# Patient Record
(demographics unavailable — no encounter records)

---

## 2024-11-15 NOTE — CARDIOLOGY SUMMARY
[de-identified] : 1/28/2022: IMPRESSIONS:Abnormal Study\par  * Myocardial Perfusion SPECT results are abnormal.\par  * There is a small, mild defect in proximal to mid\par  inferolateral wall that is fixed, suggestive of infarct.\par  * Post-stress gated wall motion analysis was performed\par  (LVEF = 51 %;LVEDV = 116 ml.), revealing normal LV\par  function with no segmental wall motion abnormalities. The\par  RV appears normal. [de-identified] : 2/4/2022: CONCLUSIONS:\par  1. Mitral annular calcification, otherwise normal mitral\par  valve. Minimal mitral regurgitation.\par  2. Severely dilated left atrium.  LA volume index = 64\par  cc/m2.\par  3. Normal left ventricular internal dimensions and wall\par  thicknesses.\par  4. Normal left ventricular systolic function. No segmental\par  wall motion abnormalities.\par  5. Mild diastolic dysfunction (Stage I).\par  6. Normal right ventricular size and function.  A device\par  wire is noted in the right heart.\par  \par  9/23/2018, 1. Normal mitral valve. Minimal mitral regurgitation.2. Mildly dilated left atrium. LA volume index = 40 cc/m2.3. Normal left ventricular internal dimensions and wallthicknesses.4. Normal left ventricular systolic function. No segmentalwall motion abnormalities.5. Mild diastolic dysfunction (Stage I).6. Normal right ventricular size and function.7. Normal tricuspid valve. Minimal tricuspid regurgitation.8. Estimated pulmonary artery systolic pressure equals 29mm Hg, assuming right atrial pressure equals 10 mm Hg,consistent with normal pulmonary pressures. LVEF 63%.

## 2024-11-15 NOTE — REASON FOR VISIT
[Arrhythmia/ECG Abnorrmalities] : arrhythmia/ECG abnormalities [Other: ____] : [unfilled] [FreeTextEntry3] : Dr Albert Palacios PCP; Phil Cedeno MD

## 2024-11-15 NOTE — DISCUSSION/SUMMARY
[FreeTextEntry1] : Impression:  1. Paroxysmal atrial fibrillation: s/p most recent PVI ablation on 7/27/2023. EKG performed today to assess for presence of recurrent afib and reveals NSR. Resume Eliquis for thromboembolic prophylaxis. Patient's AF burden is 0.5%.   2. Sinus node dysfunction: s/p dual chamber PPM. Recent remote check reveals normal PPM functioning with adequate pacing/sensing thresholds. Resume routine device checks as scheduled.  3. HTN: Resume oral antihypertensives as prescribed. Encouraged heart healthy diet, sodium restriction, and weight loss. Continue regular f/u with Cardiologist for further HTN management.  4. HLD: Resume statin therapy as prescribed and regular f/u with Cardiologist for routine lipid monitoring and management.  Continue f/u with Cardiologist and RTO for f/u in 6 months- 1 year.  [EKG obtained to assist in diagnosis and management of assessed problem(s)] : EKG obtained to assist in diagnosis and management of assessed problem(s)

## 2024-11-15 NOTE — CARDIOLOGY SUMMARY
[de-identified] : 1/28/2022: IMPRESSIONS:Abnormal Study\par  * Myocardial Perfusion SPECT results are abnormal.\par  * There is a small, mild defect in proximal to mid\par  inferolateral wall that is fixed, suggestive of infarct.\par  * Post-stress gated wall motion analysis was performed\par  (LVEF = 51 %;LVEDV = 116 ml.), revealing normal LV\par  function with no segmental wall motion abnormalities. The\par  RV appears normal. [de-identified] : 2/4/2022: CONCLUSIONS:\par  1. Mitral annular calcification, otherwise normal mitral\par  valve. Minimal mitral regurgitation.\par  2. Severely dilated left atrium.  LA volume index = 64\par  cc/m2.\par  3. Normal left ventricular internal dimensions and wall\par  thicknesses.\par  4. Normal left ventricular systolic function. No segmental\par  wall motion abnormalities.\par  5. Mild diastolic dysfunction (Stage I).\par  6. Normal right ventricular size and function.  A device\par  wire is noted in the right heart.\par  \par  9/23/2018, 1. Normal mitral valve. Minimal mitral regurgitation.2. Mildly dilated left atrium. LA volume index = 40 cc/m2.3. Normal left ventricular internal dimensions and wallthicknesses.4. Normal left ventricular systolic function. No segmentalwall motion abnormalities.5. Mild diastolic dysfunction (Stage I).6. Normal right ventricular size and function.7. Normal tricuspid valve. Minimal tricuspid regurgitation.8. Estimated pulmonary artery systolic pressure equals 29mm Hg, assuming right atrial pressure equals 10 mm Hg,consistent with normal pulmonary pressures. LVEF 63%.

## 2024-11-15 NOTE — HISTORY OF PRESENT ILLNESS
[FreeTextEntry1] : Usha Mcnair is an 84y/o man with Hx of HTN, HLD, recurring syncope with noted sinus node dysfunction s/p dual chamber PPM placement on 9/24/2018, and persistent afib s/p DCCV x4 and PVI, PWI, CTI, and MA isthmus ablation on 3/25/2021, DCCV on 4/17/2023 and 6/20/2023 and now s/p PVI ablation on 7/27/2023 who presents today for routine follow up. Admits doing well. Denies chest pain, palpitations, SOB, syncope or near syncope. Remains on diltiazem/metoprolol and Eliquis as prescribed without s/s of bleeding. Patient's AF burden currently at 0.5%.

## 2025-01-28 NOTE — ASSESSMENT
[FreeTextEntry1] :  1. Paroxysmal atrial fibrillation now in sinus rhythm status post ablation status post pacemaker and now status post recent recurrence of A-fib and subsequent cardioversion. He apparently has had several cardioversions in the past. He recently underwent his second A-fib ablation and now remains in sinus rhythm and is asymptomatic.  Recent visit with Dr. Mancilla and he was in sinus rhythm at the time.  He again had another episode in January 24 of asymptomatic A-fib resolved spontaneously presently in normal sinus rhythm with a normal EKG except for occasional PVCs.  Pacemaker check in November 24 revealed rare episodes of A-fib and a well-functioning pacemaker  2. Status postcardiac pacemaker functioning normally  3. History of hypertension well-controlled on present medication   Patient presently is asymptomatic with stable vital signs and remains in normal sinus rhythm.With occasional episodes of A-fib He tolerates Eliquis

## 2025-01-28 NOTE — REASON FOR VISIT
[FreeTextEntry1] : Usha Andrade 85 years old here for routine follow-up of his cardiac status.He was seen on January 27, 2025

## 2025-01-28 NOTE — DISCUSSION/SUMMARY
[FreeTextEntry1] : Patient continue on his present regimen of medication no further cardiac workup is needed Routine follow-up again in 6 months

## 2025-01-28 NOTE — HISTORY OF PRESENT ILLNESS
[FreeTextEntry1] : Usha Mcnair is an 85y/o man with Hx of HTN, HLD, recurring syncope with noted sinus node dysfunction s/p dual chamber PPM placement on 9/24/2018, and persistent afib s/p DCCV x4 (most recent 9/8/2022) and PVI, PWI, CTI, and MA isthmus ablation on 3/25/2021 who presents today for routine follow up. He is doing well since the last visit. Continue taking Cardizem, Eliquis and Metorprolol. PPM remote monitoring reveals Afib on 11/21/23 lasted longest for 1hr 16 min. Noted brief episodes of NSVT on 12/3/22 and 12/6/22 .  Denies chest pain, palpitations, sob, syncope.   I have not seen the patient for several years and has been followed quite carefully by Dr. David Mancilla. The patient overall feels well and denies palpitations headache chest pain shortness of breath.  His internal medicine physician is Dr. Albert Palacios  2D echo February 22, 2023 severely dilated left atrium mitral annular calcification minimal mitral vegetation normal left ventricular systolic function normal right ventricular size and function device wire seen mild diastolic dysfunction  Visit January 31, 2023: The patient was recently told to increase his Cardizem from 2  but he is reluctant as he overall feels well and denies chest pain chest pressure shortness of breath.  He overall feels well and has no complaints and is fully active.  Medications include Cardizem  which he has been told to increase to 300 prefers not to Eliquis 5 mg twice a day lovastatin 10Omega-3 pantoprazole 40 Flomax 0.4 vitamin C zinc 50   visit May 23, 2023: The patient in March 2023 was noted to be in atrial fibrillation.  He saw Dr. Mancilla and subsequently underwent another cardioversion.  Today on MayMay 23 he remains in normal sinus rhythm.  He overall feels well denies significant shortness of breath or chest pain or palpitations.  He denies edema orthopnea PND   recent blood tests April 11, 2023   total cholesterol 179 triglycerides 41 HDL 70 cholesterol/HDL ratio 2.6  GFR 54 creatinine 1.32 normal liver function tests   CT of the chest 4/23/2023: Benign 4 mm granuloma right lower lobe diffuse interstitial lung disease consisting primarily of recapitulation of the periphery of the lungs no honeycombing no bronchiectasis very mild paraseptal emphysema in the apices ascending aorta measures 3.7 there is a moderate sliding gastric hiatus hernia   EKG May 23, 2023 normal sinus rhythm short VT interval tall R waves V1 V2 otherwise unremarkable  Visit September 19, 2023: The patient developed recurrent atrial fibrillation with a very rapid ventricular response this happened at the end of August.  He had a cardioversion to sinus rhythm but subsequently went on his underwent a second A-fib ablation which was successful.  He remains in sinus rhythm and overall feels well  His medications include Eliquis Toprol  Cardizem  lovastatin 10 Presently he remains in sinus rhythm feels well no chest pain or shortness of breath or palpitations.  He has had no clinical recurrence of A-fib since the last ablation several weeks ago  Visit December 19, 2023: The patient has remained in normal sinus rhythm.  He offers no complaints of chest pain chest pressure shortness of breath.  He has good energy and good exercise tolerance.  He remains on stable medications.  He does relate that he had a previous CAT scan with some nodule in his lungs and wants to know when to follow-up.  He was a previous smoker many years ago  Visit April 17, 2024: EKG normal sinus rhythm occasional unifocal PVCs otherwise unremarkable patient remains in normal sinus rhythm He offers no complaints of chest pain chest pressure.  He has no palpitations dizziness or syncope Apparently in January 2024 he was monitored and did have an episode of sustained A-fib which resolved spontaneously he was asymptomatic  He remains on Eliquis 5 twice daily diltiazem 240 lovastatin 10 metoprolol  omega-3 and pantoprazole  Visit January 27, 2025 Patient continues to do extremely well.  He denies chest pain chest pressure or shortness of breath.  He remains on stable medication.  His pacemaker was checked in November 2024 and was functioning normally with rare episodes of atrial fibrillation brief  He remains on stable medication including metoprolol  Cardizem  and Eliquis

## 2025-01-28 NOTE — PHYSICAL EXAM
[Well Developed] : well developed [Well Nourished] : well nourished [No Acute Distress] : no acute distress [Normal Conjunctiva] : normal conjunctiva [No Carotid Bruit] : no carotid bruit [Normal S1, S2] : normal S1, S2 [No Murmur] : no murmur [Clear Lung Fields] : clear lung fields [Soft] : abdomen soft [Non Tender] : non-tender [No Edema] : no edema [Normal DP B/L] : normal DP B/L [de-identified] : Looks well and healthy  rhythm regular [de-identified] : Rhythm regular

## 2025-05-17 NOTE — HISTORY OF PRESENT ILLNESS
[FreeTextEntry1] : Usha Mcnair is an 85y/o man with Hx of HTN, HLD, recurring syncope with noted sinus node dysfunction s/p dual chamber PPM placement on 9/24/2018, and persistent afib s/p DCCV x4 and PVI, PWI, CTI, and MA isthmus ablation on 3/25/2021, DCCV on 4/17/2023 and 6/20/2023 and now s/p PVI ablation on 7/27/2023 who presents today for routine follow up. Admits doing well. Denies chest pain, palpitations, SOB, syncope or near syncope. Remains on diltiazem/metoprolol and Eliquis as prescribed without s/s of bleeding. Last episode of afib back in January lasting 13 hours.

## 2025-05-17 NOTE — DISCUSSION/SUMMARY
[FreeTextEntry1] : Impression:  1. Paroxysmal atrial fibrillation: s/p most recent PVI ablation on 7/27/2023. EKG performed today to assess for presence of recurrent afib and reveals NSR. Resume Eliquis for thromboembolic prophylaxis. Will resume metoprolol to decrease AF burden and control rate if patient develops AF.   2. Sinus node dysfunction: s/p dual chamber PPM. Recent remote check reveals normal PPM functioning with adequate pacing/sensing thresholds. Resume routine device checks as scheduled.  3. HTN: Resume oral antihypertensives as prescribed. Encouraged heart healthy diet, sodium restriction, and weight loss. Continue regular f/u with Cardiologist for further HTN management.  4. HLD: Resume statin therapy as prescribed and regular f/u with Cardiologist for routine lipid monitoring and management.  Continue f/u with Cardiologist and RTO for f/u in 6 months- 1 year.  [EKG obtained to assist in diagnosis and management of assessed problem(s)] : EKG obtained to assist in diagnosis and management of assessed problem(s)

## 2025-05-17 NOTE — HISTORY OF PRESENT ILLNESS
[FreeTextEntry1] : Usha Mcnair is an 87y/o man with Hx of HTN, HLD, recurring syncope with noted sinus node dysfunction s/p dual chamber PPM placement on 9/24/2018, and persistent afib s/p DCCV x4 and PVI, PWI, CTI, and MA isthmus ablation on 3/25/2021, DCCV on 4/17/2023 and 6/20/2023 and now s/p PVI ablation on 7/27/2023 who presents today for routine follow up. Admits doing well. Denies chest pain, palpitations, SOB, syncope or near syncope. Remains on diltiazem/metoprolol and Eliquis as prescribed without s/s of bleeding. Last episode of afib back in January lasting 13 hours.

## 2025-05-17 NOTE — CARDIOLOGY SUMMARY
[de-identified] : 1/28/2022: IMPRESSIONS:Abnormal Study\par  * Myocardial Perfusion SPECT results are abnormal.\par  * There is a small, mild defect in proximal to mid\par  inferolateral wall that is fixed, suggestive of infarct.\par  * Post-stress gated wall motion analysis was performed\par  (LVEF = 51 %;LVEDV = 116 ml.), revealing normal LV\par  function with no segmental wall motion abnormalities. The\par  RV appears normal. [de-identified] : 2/4/2022: CONCLUSIONS:\par  1. Mitral annular calcification, otherwise normal mitral\par  valve. Minimal mitral regurgitation.\par  2. Severely dilated left atrium.  LA volume index = 64\par  cc/m2.\par  3. Normal left ventricular internal dimensions and wall\par  thicknesses.\par  4. Normal left ventricular systolic function. No segmental\par  wall motion abnormalities.\par  5. Mild diastolic dysfunction (Stage I).\par  6. Normal right ventricular size and function.  A device\par  wire is noted in the right heart.\par  \par  9/23/2018, 1. Normal mitral valve. Minimal mitral regurgitation.2. Mildly dilated left atrium. LA volume index = 40 cc/m2.3. Normal left ventricular internal dimensions and wallthicknesses.4. Normal left ventricular systolic function. No segmentalwall motion abnormalities.5. Mild diastolic dysfunction (Stage I).6. Normal right ventricular size and function.7. Normal tricuspid valve. Minimal tricuspid regurgitation.8. Estimated pulmonary artery systolic pressure equals 29mm Hg, assuming right atrial pressure equals 10 mm Hg,consistent with normal pulmonary pressures. LVEF 63%.

## 2025-05-17 NOTE — CARDIOLOGY SUMMARY
[de-identified] : 1/28/2022: IMPRESSIONS:Abnormal Study\par  * Myocardial Perfusion SPECT results are abnormal.\par  * There is a small, mild defect in proximal to mid\par  inferolateral wall that is fixed, suggestive of infarct.\par  * Post-stress gated wall motion analysis was performed\par  (LVEF = 51 %;LVEDV = 116 ml.), revealing normal LV\par  function with no segmental wall motion abnormalities. The\par  RV appears normal. [de-identified] : 2/4/2022: CONCLUSIONS:\par  1. Mitral annular calcification, otherwise normal mitral\par  valve. Minimal mitral regurgitation.\par  2. Severely dilated left atrium.  LA volume index = 64\par  cc/m2.\par  3. Normal left ventricular internal dimensions and wall\par  thicknesses.\par  4. Normal left ventricular systolic function. No segmental\par  wall motion abnormalities.\par  5. Mild diastolic dysfunction (Stage I).\par  6. Normal right ventricular size and function.  A device\par  wire is noted in the right heart.\par  \par  9/23/2018, 1. Normal mitral valve. Minimal mitral regurgitation.2. Mildly dilated left atrium. LA volume index = 40 cc/m2.3. Normal left ventricular internal dimensions and wallthicknesses.4. Normal left ventricular systolic function. No segmentalwall motion abnormalities.5. Mild diastolic dysfunction (Stage I).6. Normal right ventricular size and function.7. Normal tricuspid valve. Minimal tricuspid regurgitation.8. Estimated pulmonary artery systolic pressure equals 29mm Hg, assuming right atrial pressure equals 10 mm Hg,consistent with normal pulmonary pressures. LVEF 63%.

## 2025-07-29 NOTE — HISTORY OF PRESENT ILLNESS
[FreeTextEntry1] : Usha Mcnair is an 85y/o man with Hx of HTN, HLD, recurring syncope with noted sinus node dysfunction s/p dual chamber PPM placement on 9/24/2018, and persistent afib s/p DCCV x4 (most recent 9/8/2022) and PVI, PWI, CTI, and MA isthmus ablation on 3/25/2021 who presents today for routine follow up. He is doing well since the last visit. Continue taking Cardizem, Eliquis and Metorprolol. PPM remote monitoring reveals Afib on 11/21/23 lasted longest for 1hr 16 min. Noted brief episodes of NSVT on 12/3/22 and 12/6/22 .  Denies chest pain, palpitations, sob, syncope.   I have not seen the patient for several years and has been followed quite carefully by Dr. David Mancilla. The patient overall feels well and denies palpitations headache chest pain shortness of breath.  His internal medicine physician is Dr. Albert Palacios  2D echo February 22, 2023 severely dilated left atrium mitral annular calcification minimal mitral vegetation normal left ventricular systolic function normal right ventricular size and function device wire seen mild diastolic dysfunction  Visit January 31, 2023: The patient was recently told to increase his Cardizem from 2  but he is reluctant as he overall feels well and denies chest pain chest pressure shortness of breath.  He overall feels well and has no complaints and is fully active.  Medications include Cardizem  which he has been told to increase to 300 prefers not to Eliquis 5 mg twice a day lovastatin 10Omega-3 pantoprazole 40 Flomax 0.4 vitamin C zinc 50   visit May 23, 2023: The patient in March 2023 was noted to be in atrial fibrillation.  He saw Dr. Mancilla and subsequently underwent another cardioversion.  Today on MayMay 23 he remains in normal sinus rhythm.  He overall feels well denies significant shortness of breath or chest pain or palpitations.  He denies edema orthopnea PND   recent blood tests April 11, 2023   total cholesterol 179 triglycerides 41 HDL 70 cholesterol/HDL ratio 2.6  GFR 54 creatinine 1.32 normal liver function tests   CT of the chest 4/23/2023: Benign 4 mm granuloma right lower lobe diffuse interstitial lung disease consisting primarily of recapitulation of the periphery of the lungs no honeycombing no bronchiectasis very mild paraseptal emphysema in the apices ascending aorta measures 3.7 there is a moderate sliding gastric hiatus hernia   EKG May 23, 2023 normal sinus rhythm short HI interval tall R waves V1 V2 otherwise unremarkable  Visit September 19, 2023: The patient developed recurrent atrial fibrillation with a very rapid ventricular response this happened at the end of August.  He had a cardioversion to sinus rhythm but subsequently went on his underwent a second A-fib ablation which was successful.  He remains in sinus rhythm and overall feels well  His medications include Eliquis Toprol  Cardizem  lovastatin 10 Presently he remains in sinus rhythm feels well no chest pain or shortness of breath or palpitations.  He has had no clinical recurrence of A-fib since the last ablation several weeks ago  Visit December 19, 2023: The patient has remained in normal sinus rhythm.  He offers no complaints of chest pain chest pressure shortness of breath.  He has good energy and good exercise tolerance.  He remains on stable medications.  He does relate that he had a previous CAT scan with some nodule in his lungs and wants to know when to follow-up.  He was a previous smoker many years ago  Visit April 17, 2024: EKG normal sinus rhythm occasional unifocal PVCs otherwise unremarkable patient remains in normal sinus rhythm He offers no complaints of chest pain chest pressure.  He has no palpitations dizziness or syncope Apparently in January 2024 he was monitored and did have an episode of sustained A-fib which resolved spontaneously he was asymptomatic  He remains on Eliquis 5 twice daily diltiazem 240 lovastatin 10 metoprolol  omega-3 and pantoprazole  Visit January 27, 2025 Patient continues to do extremely well.  He denies chest pain chest pressure or shortness of breath.  He remains on stable medication.  His pacemaker was checked in November 2024 and was functioning normally with rare episodes of atrial fibrillation brief  He remains on stable medication including metoprolol  Cardizem  and Eliquis  Visit July 29, 2025 Patient feels well and offers no complaints of chest pain chest pressure or palpitations.  He remains on stable medication.  He recently saw Dr. Mancilla electrophysiology who found him to be in sinus rhythm He has no shortness of breath palpitations dizziness or syncope

## 2025-07-29 NOTE — PHYSICAL EXAM
[Well Developed] : well developed [Well Nourished] : well nourished [No Acute Distress] : no acute distress [Normal Conjunctiva] : normal conjunctiva [No Carotid Bruit] : no carotid bruit [Normal S1, S2] : normal S1, S2 [No Murmur] : no murmur [Clear Lung Fields] : clear lung fields [Soft] : abdomen soft [Non Tender] : non-tender [No Edema] : no edema [Normal DP B/L] : normal DP B/L [de-identified] : Looks well and healthy  rhythm regular [de-identified] : Rhythm regular

## 2025-07-29 NOTE — ASSESSMENT
[FreeTextEntry1] : 1. Paroxysmal atrial fibrillation now in sinus rhythm status post ablation status post pacemaker and now status post recent recurrence of A-fib and subsequent cardioversion. He apparently has had several cardioversions in the past. He recently underwent his second A-fib ablation and now remains in sinus rhythm and is asymptomatic.  Recent visit with Dr. Mancilla and he was in sinus rhythm at the time.  He again had another episode in January 24 of asymptomatic A-fib resolved spontaneously presently in normal sinus rhythm with a normal EKG except for occasional PVCs.  Pacemaker check in November 2024 revealed rare episodes of A-fib and a well-functioning pacemaker He remains stable in July 2025 with no significant symptoms and remains in normal sinus rhythm  2. Status postcardiac pacemaker functioning normally  3. History of hypertension well-controlled on present medication   Patient presently is asymptomatic with stable vital signs and remains in normal sinus rhythm.There are no active cardiac issues presently

## 2025-07-29 NOTE — DISCUSSION/SUMMARY
[FreeTextEntry1] : Patient continue on his present regimen of medication no further cardiac workup is needed Routine follow-up again in 6 months Follow-up with electrophysiology as well

## 2025-07-29 NOTE — REASON FOR VISIT
[FreeTextEntry1] : Usha Andrade 86 years old here for routine follow-up of his cardiac status.  He was seen on July 29th 2025